# Patient Record
Sex: FEMALE | URBAN - METROPOLITAN AREA
[De-identification: names, ages, dates, MRNs, and addresses within clinical notes are randomized per-mention and may not be internally consistent; named-entity substitution may affect disease eponyms.]

---

## 2020-02-08 ENCOUNTER — OFFICE VISIT (OUTPATIENT)
Dept: URBAN - METROPOLITAN AREA CLINIC 22 | Facility: CLINIC | Age: 65
End: 2020-02-08
Payer: MEDICARE

## 2020-02-08 DIAGNOSIS — H18.51 FUCHS' DYSTROPHY: ICD-10-CM

## 2020-02-08 DIAGNOSIS — H26.492 OTHER SECONDARY CATARACT, LEFT EYE: Primary | ICD-10-CM

## 2020-02-08 PROCEDURE — 92014 COMPRE OPH EXAM EST PT 1/>: CPT | Performed by: OPTOMETRIST

## 2020-02-08 RX ORDER — SODIUM CHLORIDE 50 MG/ML
5 % SOLUTION OPHTHALMIC
Qty: 1 | Refills: 6 | Status: INACTIVE
Start: 2020-02-08 | End: 2020-02-29

## 2020-02-08 ASSESSMENT — INTRAOCULAR PRESSURE
OS: 15
OD: 16

## 2020-02-08 ASSESSMENT — VISUAL ACUITY
OS: 20/80
OD: 20/20

## 2020-02-08 NOTE — IMPRESSION/PLAN
Impression: Other secondary cataract, left eye: H26.492. OS. Plan: Discussed today's findings with patient. Discussed YAG capsulotomy procedure along with the risks, benefitrs and side effects. Patient would like to go forward with YAG Capsulotomy. supervision

## 2020-02-20 ENCOUNTER — SURGERY (OUTPATIENT)
Dept: URBAN - METROPOLITAN AREA SURGERY 11 | Facility: SURGERY | Age: 65
End: 2020-02-20
Payer: MEDICARE

## 2020-02-20 PROCEDURE — 66821 AFTER CATARACT LASER SURGERY: CPT | Performed by: OPHTHALMOLOGY

## 2020-03-07 ENCOUNTER — OFFICE VISIT (OUTPATIENT)
Dept: URBAN - METROPOLITAN AREA CLINIC 22 | Facility: CLINIC | Age: 65
End: 2020-03-07
Payer: MEDICARE

## 2020-03-07 PROCEDURE — 99212 OFFICE O/P EST SF 10 MIN: CPT | Performed by: OPTOMETRIST

## 2020-03-07 ASSESSMENT — INTRAOCULAR PRESSURE
OS: 14
OD: 17

## 2020-03-07 ASSESSMENT — VISUAL ACUITY
OS: 20/25
OD: 20/25

## 2020-03-07 NOTE — IMPRESSION/PLAN
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 Plan: continue to monitor on the yearly vision improved with YAG. If condition is getting worst Dr. Charlynn Alpers with [facility].

## 2021-01-20 ENCOUNTER — OFFICE VISIT (OUTPATIENT)
Dept: URBAN - METROPOLITAN AREA CLINIC 22 | Facility: CLINIC | Age: 66
End: 2021-01-20
Payer: MEDICARE

## 2021-01-20 DIAGNOSIS — H18.513 ENDOTHELIAL CORNEAL DYSTROPHY, BILATERAL: ICD-10-CM

## 2021-01-20 PROCEDURE — 99214 OFFICE O/P EST MOD 30 MIN: CPT | Performed by: OPTOMETRIST

## 2021-01-20 ASSESSMENT — INTRAOCULAR PRESSURE
OD: 18
OS: 20

## 2021-01-20 NOTE — IMPRESSION/PLAN
Impression: Endothelial corneal dystrophy, bilateral: H18.513.  Bilateral. Plan: continue howard 128 and artificial tears

## 2021-02-18 ENCOUNTER — OFFICE VISIT (OUTPATIENT)
Dept: URBAN - METROPOLITAN AREA CLINIC 22 | Facility: CLINIC | Age: 66
End: 2021-02-18
Payer: MEDICARE

## 2021-02-18 DIAGNOSIS — H43.812 VITREOUS DEGENERATION, LEFT EYE: Primary | ICD-10-CM

## 2021-02-18 PROCEDURE — 99213 OFFICE O/P EST LOW 20 MIN: CPT | Performed by: OPTOMETRIST

## 2021-02-18 ASSESSMENT — INTRAOCULAR PRESSURE
OD: 19
OS: 14

## 2021-02-18 NOTE — IMPRESSION/PLAN
Impression: Vitreous degeneration, left eye: H43.812. Left.  Plan: signs and symptoms of RD explained, RTC immediately

## 2021-02-18 NOTE — IMPRESSION/PLAN
Impression: Endothelial corneal dystrophy, bilateral: H18.513 Bilateral. Plan: continue taking Mac 128 and using artifical tears QID

## 2021-02-18 NOTE — IMPRESSION/PLAN
Impression: Vitreous degeneration, left eye: H43.812 Left.  Plan: signs and symptoms of RD explained, RTC immediately

## 2021-04-12 ENCOUNTER — TESTING ONLY (OUTPATIENT)
Dept: URBAN - METROPOLITAN AREA CLINIC 22 | Facility: CLINIC | Age: 66
End: 2021-04-12

## 2021-04-12 DIAGNOSIS — H52.4 PRESBYOPIA: Primary | ICD-10-CM

## 2021-04-12 ASSESSMENT — VISUAL ACUITY
OS: 20/25
OD: 20/25

## 2021-04-12 NOTE — IMPRESSION/PLAN
Impression: Presbyopia: H52.4. Plan: Discussed findings. Trialframed today's MRx, pt confirmed improved and comfortable vision compared to old Rx. New Rx finalized and given to patient.